# Patient Record
Sex: FEMALE | Race: WHITE | NOT HISPANIC OR LATINO | URBAN - METROPOLITAN AREA
[De-identification: names, ages, dates, MRNs, and addresses within clinical notes are randomized per-mention and may not be internally consistent; named-entity substitution may affect disease eponyms.]

---

## 2019-01-05 ENCOUNTER — EMERGENCY (EMERGENCY)
Facility: HOSPITAL | Age: 29
LOS: 1 days | Discharge: HOME | End: 2019-01-05
Attending: EMERGENCY MEDICINE | Admitting: EMERGENCY MEDICINE
Payer: COMMERCIAL

## 2019-01-05 VITALS
HEART RATE: 140 BPM | DIASTOLIC BLOOD PRESSURE: 87 MMHG | RESPIRATION RATE: 18 BRPM | SYSTOLIC BLOOD PRESSURE: 131 MMHG | OXYGEN SATURATION: 100 % | TEMPERATURE: 98 F

## 2019-01-05 VITALS
RESPIRATION RATE: 18 BRPM | SYSTOLIC BLOOD PRESSURE: 101 MMHG | OXYGEN SATURATION: 98 % | HEART RATE: 83 BPM | DIASTOLIC BLOOD PRESSURE: 66 MMHG

## 2019-01-05 DIAGNOSIS — L50.0 ALLERGIC URTICARIA: ICD-10-CM

## 2019-01-05 LAB
ALBUMIN SERPL ELPH-MCNC: 4 G/DL — SIGNIFICANT CHANGE UP (ref 3.4–5)
ALP SERPL-CCNC: 51 U/L — SIGNIFICANT CHANGE UP (ref 40–120)
ALT FLD-CCNC: 18 U/L — SIGNIFICANT CHANGE UP (ref 12–42)
ANION GAP SERPL CALC-SCNC: 13 MMOL/L — SIGNIFICANT CHANGE UP (ref 9–16)
APTT BLD: 27.3 SEC — LOW (ref 27.5–36.3)
AST SERPL-CCNC: 21 U/L — SIGNIFICANT CHANGE UP (ref 15–37)
BASOPHILS NFR BLD AUTO: 0.1 % — SIGNIFICANT CHANGE UP (ref 0–2)
BILIRUB SERPL-MCNC: 1 MG/DL — SIGNIFICANT CHANGE UP (ref 0.2–1.2)
BUN SERPL-MCNC: 14 MG/DL — SIGNIFICANT CHANGE UP (ref 7–23)
CALCIUM SERPL-MCNC: 9.1 MG/DL — SIGNIFICANT CHANGE UP (ref 8.5–10.5)
CHLORIDE SERPL-SCNC: 105 MMOL/L — SIGNIFICANT CHANGE UP (ref 96–108)
CO2 SERPL-SCNC: 20 MMOL/L — LOW (ref 22–31)
CREAT SERPL-MCNC: 0.82 MG/DL — SIGNIFICANT CHANGE UP (ref 0.5–1.3)
EOSINOPHIL NFR BLD AUTO: 0.1 % — SIGNIFICANT CHANGE UP (ref 0–6)
GLUCOSE SERPL-MCNC: 132 MG/DL — HIGH (ref 70–99)
HCG SERPL-ACNC: <1 MIU/ML — SIGNIFICANT CHANGE UP
HCT VFR BLD CALC: 45.3 % — HIGH (ref 34.5–45)
HGB BLD-MCNC: 15.3 G/DL — SIGNIFICANT CHANGE UP (ref 11.5–15.5)
IMM GRANULOCYTES NFR BLD AUTO: 0.4 % — SIGNIFICANT CHANGE UP (ref 0–1.5)
INR BLD: 1.05 — SIGNIFICANT CHANGE UP (ref 0.88–1.16)
LYMPHOCYTES # BLD AUTO: 15.5 % — SIGNIFICANT CHANGE UP (ref 13–44)
MCHC RBC-ENTMCNC: 32 PG — SIGNIFICANT CHANGE UP (ref 27–34)
MCHC RBC-ENTMCNC: 33.8 G/DL — SIGNIFICANT CHANGE UP (ref 32–36)
MCV RBC AUTO: 94.8 FL — SIGNIFICANT CHANGE UP (ref 80–100)
MONOCYTES NFR BLD AUTO: 3.4 % — SIGNIFICANT CHANGE UP (ref 2–14)
NEUTROPHILS NFR BLD AUTO: 80.5 % — HIGH (ref 43–77)
PLATELET # BLD AUTO: 235 K/UL — SIGNIFICANT CHANGE UP (ref 150–400)
POTASSIUM SERPL-MCNC: 4.2 MMOL/L — SIGNIFICANT CHANGE UP (ref 3.5–5.3)
POTASSIUM SERPL-SCNC: 4.2 MMOL/L — SIGNIFICANT CHANGE UP (ref 3.5–5.3)
PROT SERPL-MCNC: 7.7 G/DL — SIGNIFICANT CHANGE UP (ref 6.4–8.2)
PROTHROM AB SERPL-ACNC: 11.6 SEC — SIGNIFICANT CHANGE UP (ref 10–12.9)
RBC # BLD: 4.78 M/UL — SIGNIFICANT CHANGE UP (ref 3.8–5.2)
RBC # FLD: 11.9 % — SIGNIFICANT CHANGE UP (ref 10.3–14.5)
SODIUM SERPL-SCNC: 138 MMOL/L — SIGNIFICANT CHANGE UP (ref 132–145)
WBC # BLD: 14 K/UL — HIGH (ref 3.8–10.5)
WBC # FLD AUTO: 14 K/UL — HIGH (ref 3.8–10.5)

## 2019-01-05 PROCEDURE — 93010 ELECTROCARDIOGRAM REPORT: CPT

## 2019-01-05 PROCEDURE — 99218: CPT

## 2019-01-05 RX ORDER — EPINEPHRINE 0.3 MG/.3ML
0.3 INJECTION INTRAMUSCULAR; SUBCUTANEOUS ONCE
Qty: 0 | Refills: 0 | Status: COMPLETED | OUTPATIENT
Start: 2019-01-05 | End: 2019-01-05

## 2019-01-05 RX ORDER — EPINEPHRINE 0.3 MG/.3ML
0.3 INJECTION INTRAMUSCULAR; SUBCUTANEOUS
Qty: 1 | Refills: 0 | OUTPATIENT
Start: 2019-01-05

## 2019-01-05 RX ORDER — SODIUM CHLORIDE 9 MG/ML
1000 INJECTION INTRAMUSCULAR; INTRAVENOUS; SUBCUTANEOUS ONCE
Qty: 0 | Refills: 0 | Status: COMPLETED | OUTPATIENT
Start: 2019-01-05 | End: 2019-01-05

## 2019-01-05 RX ORDER — FAMOTIDINE 10 MG/ML
20 INJECTION INTRAVENOUS ONCE
Qty: 0 | Refills: 0 | Status: COMPLETED | OUTPATIENT
Start: 2019-01-05 | End: 2019-01-05

## 2019-01-05 RX ORDER — ONDANSETRON 8 MG/1
4 TABLET, FILM COATED ORAL ONCE
Qty: 0 | Refills: 0 | Status: COMPLETED | OUTPATIENT
Start: 2019-01-05 | End: 2019-01-05

## 2019-01-05 RX ORDER — DIPHENHYDRAMINE HCL 50 MG
50 CAPSULE ORAL ONCE
Qty: 0 | Refills: 0 | Status: COMPLETED | OUTPATIENT
Start: 2019-01-05 | End: 2019-01-05

## 2019-01-05 RX ADMIN — EPINEPHRINE 0.3 MILLIGRAM(S): 0.3 INJECTION INTRAMUSCULAR; SUBCUTANEOUS at 10:20

## 2019-01-05 RX ADMIN — FAMOTIDINE 20 MILLIGRAM(S): 10 INJECTION INTRAVENOUS at 10:20

## 2019-01-05 RX ADMIN — ONDANSETRON 4 MILLIGRAM(S): 8 TABLET, FILM COATED ORAL at 10:20

## 2019-01-05 RX ADMIN — Medication 50 MILLIGRAM(S): at 10:20

## 2019-01-05 RX ADMIN — SODIUM CHLORIDE 1000 MILLILITER(S): 9 INJECTION INTRAMUSCULAR; INTRAVENOUS; SUBCUTANEOUS at 10:20

## 2019-01-05 RX ADMIN — Medication 125 MILLIGRAM(S): at 10:20

## 2019-01-05 NOTE — ED CDU PROVIDER INITIAL DAY NOTE - PROGRESS NOTE DETAILS
Pt is feeling much better.  Swelling and hives much improved.  Pt requesting discharge.  Discussed f/u instructions (pt already has appt with an allergist for next week).  Discussed Rx's including Prednisone and Epipen.  Discussed OTC use of benadryl.  Discussed emergent return precautions and provided training on epipen use.

## 2019-01-05 NOTE — ED ADULT NURSE NOTE - CHPI ED NUR SYMPTOMS NEG
no shortness of breath/no vomiting/no wheezing/no difficulty breathing/no difficulty swallowing/no congestion/no swelling of face, tongue/no throat itching

## 2019-01-05 NOTE — ED CDU PROVIDER INITIAL DAY NOTE - DETAILS
Allergic reaction.  Pt needs prolonged monitoring while steroids kick in.  Will perform serial exams and tele monitoring.

## 2019-01-05 NOTE — ED ADULT NURSE NOTE - OBJECTIVE STATEMENT
Pt presents to ED Pt sent by PCP for further evaluation of generalized nausea, body hives that started yesterday and periorbital edema and lip swelling that she woke up with this morning. Pt also reports redness and edema to knees and finger joints. Pt denies any difficulty breathing, no throat/tongue swelling. Pt speaking in full complete sentences. Pt denies any known allergies or similar episodes in the past. No new medications.

## 2019-01-05 NOTE — ED PROVIDER NOTE - MEDICAL DECISION MAKING DETAILS
Anaphylaxis.  Epipen, IV, cardiac monitor, ekg, solu-medrol, H1/2 bloackers, and IV hydration.  Lungs clear so will hold off on albuterol.  Will place on observation and perform prolonged monitoring.

## 2019-01-05 NOTE — ED ADULT NURSE NOTE - NSIMPLEMENTINTERV_GEN_ALL_ED
Implemented All Universal Safety Interventions:  [place]eraldine to call system. Call bell, personal items and telephone within reach. Instruct patient to call for assistance. Room bathroom lighting operational. Non-slip footwear when patient is off stretcher. Physically safe environment: no spills, clutter or unnecessary equipment. Stretcher in lowest position, wheels locked, appropriate side rails in place.

## 2019-01-05 NOTE — ED CDU PROVIDER INITIAL DAY NOTE - MEDICAL DECISION MAKING DETAILS
Anaphylaxis.  Placed on observation for prolonged monitoring to ensure improvement and no deterioration.  Pt responding well to epi pen.

## 2019-01-05 NOTE — ED CDU PROVIDER DISPOSITION NOTE - NSFOLLOWUPINSTRUCTIONS_ED_ALL_ED_FT
PLEASE FOLLOW-UP WITH THE ALLERGIST AS PLANNED.     PLEASE GO TO YOUR PHARMACY TO FILL YOU PRESCRIPTIONS.  PLEASE START TODAY AND USE AS DIRECTED.    PLEASE RETURN TO THE ER IMMEDIATELY OR CALL 911 FOR ANY HIGH FEVER, TROUBLE BREATHING, VOMITING, SEVERE PAIN, OR ANY OTHER CONCERNS.

## 2019-01-05 NOTE — ED PROVIDER NOTE - PHYSICAL EXAMINATION
VITAL SIGNS: I have reviewed nursing notes and confirm.  CONSTITUTIONAL: Well-developed; well-nourished; anxious but appropriate given situation.  SKIN: Diffuse urticaria.   HEAD: Normocephalic; atraumatic.  EYES: PERRL, EOM intact; conjunctiva and sclera clear; +Periorbital edema.   ENT: +Upper lip edema.  No tongue or uvular edema.   NECK: Supple; non tender.  CARD: S1, S2 normal; no murmurs, gallops, or rubs. +Tachycardia - regular.   RESP: No wheezes, rales or rhonchi.  ABD: Normal bowel sounds; soft; non-distended; non-tender; no hepatosplenomegaly.  EXT: Normal ROM.  +Edema to UEs.   NEURO: Alert, oriented. Grossly unremarkable.  PSYCH: Cooperative, appropriate.

## 2019-01-05 NOTE — ED ADULT TRIAGE NOTE - CHIEF COMPLAINT QUOTE
sent here from doc office fro allergic rx since yesterday. tachy in triage. bilat lip swelling and gen hives. denies resp distrwess

## 2019-01-05 NOTE — ED PROVIDER NOTE - OBJECTIVE STATEMENT
28y F with no pertinent PMHx, on herpes prophylaxis, nkda, presents to ED from PCP's office for allergic 28y F with no pertinent PMHx, on herpes prophylaxis, nkda, presents to ED from PCP's office for allergic reaction. Pt states she woke up yesterday with mild periorbital hives, which worsened throughout the day. She woke up this morning with swelling to her bilateral knees, arms, ankles, and feet, as well as her upper lip. She also began experiencing, prompting her to see a GP at [facility] earlier today. Pt was not given medications at [facility] 2/2 her being nauseous and tachy, and was referred to ED for IV meds. No hx of asthma or eczema. Pt recently traveled to [place], but denies any hiking or outdoor activities. No known exposure to anything new. Denies tongue discomfort, difficulty swallowing, throat itching, CP, SOB. 28y F with no pertinent PMHx, on HSV prophylaxis (Valtrex), nkda, presents to ED from PCP's office for allergic reaction. Pt states she woke up yesterday with mild periorbital hives, which worsened throughout the day. She woke up this morning with swelling to her bilateral knees, arms, ankles, and feet, as well as her upper lip. She also began experiencing nausea, prompting her to see a GP at [facility] earlier today. Pt was not given medications at [facility] 2/2 her being nauseous and tachy, and was referred to ED for IV meds. No hx of asthma or eczema. Pt recently traveled to [place], but denies any hiking or outdoor activities. No known exposure to anything new. Denies tongue discomfort, difficulty swallowing, throat itching, CP, SOB, wheezing, diarrhea, abd cramping, or other sxs.

## 2019-01-19 NOTE — ED PROVIDER NOTE - ATTESTATION, MLM
Initial (On Arrival) I have reviewed and confirmed nurses' notes for patient's medications, allergies, medical history, and surgical history.

## 2021-10-01 ENCOUNTER — EMERGENCY (EMERGENCY)
Facility: HOSPITAL | Age: 31
LOS: 1 days | Discharge: HOME | End: 2021-10-01
Attending: EMERGENCY MEDICINE | Admitting: EMERGENCY MEDICINE
Payer: COMMERCIAL

## 2021-10-01 VITALS
WEIGHT: 134.92 LBS | SYSTOLIC BLOOD PRESSURE: 120 MMHG | TEMPERATURE: 98 F | RESPIRATION RATE: 16 BRPM | HEART RATE: 61 BPM | HEIGHT: 69 IN | DIASTOLIC BLOOD PRESSURE: 80 MMHG | OXYGEN SATURATION: 99 %

## 2021-10-01 LAB
APPEARANCE UR: CLEAR — SIGNIFICANT CHANGE UP
BILIRUB UR-MCNC: NEGATIVE — SIGNIFICANT CHANGE UP
COLOR SPEC: YELLOW — SIGNIFICANT CHANGE UP
DIFF PNL FLD: NEGATIVE — SIGNIFICANT CHANGE UP
GLUCOSE UR QL: NEGATIVE — SIGNIFICANT CHANGE UP
HCG UR QL: NEGATIVE — SIGNIFICANT CHANGE UP
KETONES UR-MCNC: 15 MG/DL
LEUKOCYTE ESTERASE UR-ACNC: NEGATIVE — SIGNIFICANT CHANGE UP
NITRITE UR-MCNC: NEGATIVE — SIGNIFICANT CHANGE UP
PH UR: 5.5 — SIGNIFICANT CHANGE UP (ref 5–8)
PROT UR-MCNC: NEGATIVE MG/DL — SIGNIFICANT CHANGE UP
SP GR SPEC: 1.01 — SIGNIFICANT CHANGE UP (ref 1–1.03)
UROBILINOGEN FLD QL: 0.2 E.U./DL — SIGNIFICANT CHANGE UP

## 2021-10-01 PROCEDURE — 93010 ELECTROCARDIOGRAM REPORT: CPT

## 2021-10-01 PROCEDURE — 99283 EMERGENCY DEPT VISIT LOW MDM: CPT

## 2021-10-01 NOTE — ED PROVIDER NOTE - OBJECTIVE STATEMENT
31 yo woman with complaint of dizziness x 1 day - now resolved in ER.    The patient denies the following symptoms:  headache, lightheadedness, neck pain/neck stiffness, numbness/tingling, photophobia, change in vision/hearing/gait/mental status/speech,difficulty swallowing, focal weakness, rash, fever, chills, chest/neck/jaw/arm or back pain, palpitations, shortness of breath, diaphoresis, swelling to arm and/or legs, N/V/D, abdominal pain, abdominal distention, back pain,

## 2021-10-01 NOTE — ED PROVIDER NOTE - NSFOLLOWUPINSTRUCTIONS_ED_ALL_ED_FT
stay well hydrated     ibuprofen or tylenol for headache     return to ER if symptoms worsen or for any other concern

## 2021-10-01 NOTE — ED PROVIDER NOTE - PATIENT PORTAL LINK FT
You can access the FollowMyHealth Patient Portal offered by [facility] by registering at the following website: [email or web address]. By joining [email or web address] FollowMyHealth portal, you will also be able to view your health information using other applications (apps) compatible with our system.

## 2021-10-01 NOTE — ED ADULT NURSE NOTE - OBJECTIVE STATEMENT
Pt is a 30y female complaining of dizziness, headache, and nausea after cleaning her oven. Pt states no CO2 alarm went off. Pt states that she is starting to feel better at this time.

## 2021-10-01 NOTE — ED ADULT TRIAGE NOTE - CHIEF COMPLAINT QUOTE
Walk in with c/o sudden onset dizziness with nausea, no vomiting. Pt states symptoms started as she was using the self-cleaning setting on her oven, states her windows were open and her carbon monoxide detector did not go off. Pt well appearing, although reports symptoms persistent even after leaving apartment and arriving to ED.

## 2021-10-04 DIAGNOSIS — R42 DIZZINESS AND GIDDINESS: ICD-10-CM

## 2021-10-04 DIAGNOSIS — R00.1 BRADYCARDIA, UNSPECIFIED: ICD-10-CM

## 2021-10-04 DIAGNOSIS — Z32.02 ENCOUNTER FOR PREGNANCY TEST, RESULT NEGATIVE: ICD-10-CM

## 2021-12-10 NOTE — ED PROVIDER NOTE - MUSCULOSKELETAL, MLM
pt was scratched by a stray cat last week, states "I want to make sure I don't have rabies" Spine appears normal, range of motion is not limited, no muscle or joint tenderness

## 2022-08-09 NOTE — ED ADULT NURSE NOTE - PAIN: PRESENCE, MLM
denies pain/discomfort
bilat LE pain and weakness  difficulty walking  all other ROS negative except as per HPI

## 2024-08-26 ENCOUNTER — APPOINTMENT (OUTPATIENT)
Dept: COLORECTAL SURGERY | Facility: CLINIC | Age: 34
End: 2024-08-26

## 2024-08-26 PROBLEM — Z00.00 ENCOUNTER FOR PREVENTIVE HEALTH EXAMINATION: Status: ACTIVE | Noted: 2024-08-26

## 2024-08-26 NOTE — HISTORY OF PRESENT ILLNESS
[FreeTextEntry1] : 34 y/o F presents for initial evaluation of hemorrhoids   Referred by    PMH: PSH: All: Meds: FMH: Colonoscopy